# Patient Record
Sex: FEMALE | Race: WHITE
[De-identification: names, ages, dates, MRNs, and addresses within clinical notes are randomized per-mention and may not be internally consistent; named-entity substitution may affect disease eponyms.]

---

## 2019-04-04 ENCOUNTER — HOSPITAL ENCOUNTER (INPATIENT)
Dept: HOSPITAL 10 - E/R | Age: 34
LOS: 4 days | Discharge: HOME | DRG: 743 | End: 2019-04-08
Attending: OBSTETRICS & GYNECOLOGY | Admitting: OBSTETRICS & GYNECOLOGY
Payer: COMMERCIAL

## 2019-04-04 ENCOUNTER — HOSPITAL ENCOUNTER (INPATIENT)
Dept: HOSPITAL 91 - E/R | Age: 34
LOS: 4 days | Discharge: HOME | DRG: 743 | End: 2019-04-08
Payer: COMMERCIAL

## 2019-04-04 VITALS
HEIGHT: 68 IN | WEIGHT: 159.17 LBS | WEIGHT: 159.17 LBS | BODY MASS INDEX: 24.12 KG/M2 | HEIGHT: 68 IN | BODY MASS INDEX: 24.12 KG/M2

## 2019-04-04 DIAGNOSIS — D25.9: ICD-10-CM

## 2019-04-04 DIAGNOSIS — N80.1: ICD-10-CM

## 2019-04-04 DIAGNOSIS — N83.202: Primary | ICD-10-CM

## 2019-04-04 LAB
ADD MAN DIFF?: NO
ADD UMIC: YES
ALANINE AMINOTRANSFERASE: 11 IU/L (ref 13–69)
ALBUMIN/GLOBULIN RATIO: 1.25
ALBUMIN: 4.5 G/DL (ref 3.3–4.9)
ALKALINE PHOSPHATASE: 59 IU/L (ref 42–121)
ANION GAP: 11 (ref 5–13)
ASPARTATE AMINO TRANSFERASE: 17 IU/L (ref 15–46)
BASOPHIL #: 0.1 10^3/UL (ref 0–0.1)
BASOPHILS %: 0.3 % (ref 0–2)
BILIRUBIN,DIRECT: 0 MG/DL (ref 0–0.2)
BILIRUBIN,TOTAL: 0.2 MG/DL (ref 0.2–1.3)
BLOOD UREA NITROGEN: 14 MG/DL (ref 7–20)
CALCIUM: 9.8 MG/DL (ref 8.4–10.2)
CARBON DIOXIDE: 26 MMOL/L (ref 21–31)
CHLORIDE: 102 MMOL/L (ref 97–110)
CREATININE: 0.74 MG/DL (ref 0.44–1)
EOSINOPHILS #: 0.1 10^3/UL (ref 0–0.5)
EOSINOPHILS %: 0.4 % (ref 0–7)
GLOBULIN: 3.6 G/DL (ref 1.3–3.2)
GLUCOSE: 99 MG/DL (ref 70–220)
HEMATOCRIT: 36 % (ref 37–47)
HEMOGLOBIN: 12.4 G/DL (ref 12–16)
IMMATURE GRANS #M: 0.09 10^3/UL (ref 0–0.03)
IMMATURE GRANS % (M): 0.5 % (ref 0–0.43)
LIPASE: 124 U/L (ref 23–300)
LYMPHOCYTES #: 2.7 10^3/UL (ref 0.8–2.9)
LYMPHOCYTES %: 13.7 % (ref 15–51)
MEAN CORPUSCULAR HEMOGLOBIN: 27.2 PG (ref 29–33)
MEAN CORPUSCULAR HGB CONC: 34.4 G/DL (ref 32–37)
MEAN CORPUSCULAR VOLUME: 78.9 FL (ref 82–101)
MEAN PLATELET VOLUME: 11.7 FL (ref 7.4–10.4)
MONOCYTE #: 1.1 10^3/UL (ref 0.3–0.9)
MONOCYTES %: 5.5 % (ref 0–11)
NEUTROPHIL #: 16 10^3/UL (ref 1.6–7.5)
NEUTROPHILS %: 79.6 % (ref 39–77)
NUCLEATED RED BLOOD CELLS #: 0 10^3/UL (ref 0–0)
NUCLEATED RED BLOOD CELLS%: 0 /100WBC (ref 0–0)
PLATELET COUNT: 273 10^3/UL (ref 140–415)
POTASSIUM: 4.2 MMOL/L (ref 3.5–5.1)
RED BLOOD COUNT: 4.56 10^6/UL (ref 4.2–5.4)
RED CELL DISTRIBUTION WIDTH: 13.8 % (ref 11.5–14.5)
SODIUM: 139 MMOL/L (ref 135–144)
TOTAL PROTEIN: 8.1 G/DL (ref 6.1–8.1)
UR ASCORBIC ACID: NEGATIVE MG/DL
UR BILIRUBIN (DIP): NEGATIVE MG/DL
UR BLOOD (DIP): (no result) MG/DL
UR CLARITY: CLEAR
UR COLOR: (no result)
UR GLUCOSE (DIP): NEGATIVE MG/DL
UR KETONES (DIP): NEGATIVE MG/DL
UR LEUKOCYTE ESTERASE (DIP): NEGATIVE LEU/UL
UR NITRITE (DIP): NEGATIVE MG/DL
UR PH (DIP): 5 (ref 5–9)
UR RBC: 0 /HPF (ref 0–5)
UR SPECIFIC GRAVITY (DIP): 1.01 (ref 1–1.03)
UR TOTAL PROTEIN (DIP): NEGATIVE MG/DL
UR UROBILINOGEN (DIP): NEGATIVE MG/DL
UR WBC: 6 /HPF (ref 0–5)
WHITE BLOOD COUNT: 20 10^3/UL (ref 4.8–10.8)

## 2019-04-04 PROCEDURE — 74177 CT ABD & PELVIS W/CONTRAST: CPT

## 2019-04-04 PROCEDURE — 88305 TISSUE EXAM BY PATHOLOGIST: CPT

## 2019-04-04 PROCEDURE — 88104 CYTOPATH FL NONGYN SMEARS: CPT

## 2019-04-04 PROCEDURE — 83690 ASSAY OF LIPASE: CPT

## 2019-04-04 PROCEDURE — 80053 COMPREHEN METABOLIC PANEL: CPT

## 2019-04-04 PROCEDURE — 86140 C-REACTIVE PROTEIN: CPT

## 2019-04-04 PROCEDURE — 81001 URINALYSIS AUTO W/SCOPE: CPT

## 2019-04-04 PROCEDURE — 36415 COLL VENOUS BLD VENIPUNCTURE: CPT

## 2019-04-04 PROCEDURE — 85025 COMPLETE CBC W/AUTO DIFF WBC: CPT

## 2019-04-04 PROCEDURE — 86304 IMMUNOASSAY TUMOR CA 125: CPT

## 2019-04-04 PROCEDURE — 76830 TRANSVAGINAL US NON-OB: CPT

## 2019-04-04 PROCEDURE — 96374 THER/PROPH/DIAG INJ IV PUSH: CPT

## 2019-04-04 PROCEDURE — 81025 URINE PREGNANCY TEST: CPT

## 2019-04-04 PROCEDURE — 87086 URINE CULTURE/COLONY COUNT: CPT

## 2019-04-04 PROCEDURE — 76856 US EXAM PELVIC COMPLETE: CPT

## 2019-04-04 PROCEDURE — 74176 CT ABD & PELVIS W/O CONTRAST: CPT

## 2019-04-04 PROCEDURE — 99285 EMERGENCY DEPT VISIT HI MDM: CPT

## 2019-04-04 RX ADMIN — VASOPRESSIN 1: 20 INJECTION, SOLUTION INTRAMUSCULAR; SUBCUTANEOUS at 22:57

## 2019-04-04 RX ADMIN — PIPERACILLIN SODIUM AND TAZOBACTAM SODIUM 1 MLS/HR: 3; .375 INJECTION, POWDER, LYOPHILIZED, FOR SOLUTION INTRAVENOUS at 23:19

## 2019-04-04 RX ADMIN — THIAMINE HYDROCHLORIDE 1 MLS/HR: 100 INJECTION, SOLUTION INTRAMUSCULAR; INTRAVENOUS at 23:19

## 2019-04-04 RX ADMIN — ACETAMINOPHEN 1 MG: 500 TABLET, FILM COATED ORAL at 19:20

## 2019-04-04 RX ADMIN — IOHEXOL 1 ML: 300 INJECTION, SOLUTION INTRAVENOUS at 22:57

## 2019-04-04 RX ADMIN — SODIUM CHLORIDE 1 ML: 9 INJECTION, SOLUTION INTRAMUSCULAR; INTRAVENOUS; SUBCUTANEOUS at 22:57

## 2019-04-04 NOTE — ERD
ER Documentation


Chief Complaint


Chief Complaint





pelvic pain, fever X 3 hrs





HPI


This is a 33-year-old female with a history of fibroids who presents ED with 


right lower abdominal pain that is been present since 1 AM this morning.  Yovani villegas describes the pain is pressure-like and sharp and states that it is 


constant.  Worsened by movement.  Last menstrual period 19.  Patient is 


currently on menses.  Admits to nauseA and fevers.  Denies vomiting, 


hematemesis, hemoptysis, diarrhea, cuts patient, melena, hematochezia, chest 


pain, shortness breath, dysuria, hematuria, vaginal pain, vaginal discharge and 


all other symptoms.  Patient is sexually active and uses condoms for birth 


control.  Denies history of STD or pelvic inflammatory disease.





ROS


All systems reviewed and are negative except as per history of present illness.





Allergies


Allergies:  


Coded Allergies:  


     No Known Allergy (Unverified , 19)





PMhx/Soc


Medical and Surgical Hx:  pt denies Medical Hx, pt denies Surgical Hx


Hx Alcohol Use:  No


Hx Substance Use:  No


Hx Tobacco Use:  No


Smoking Status:  Never smoker





FmHx


Family History:  No diabetes





Physical Exam


Vitals





Vital Signs


  Date      Temp  Pulse  Resp  B/P (MAP)   Pulse Ox  O2          O2 Flow    FiO2


Time                                                 Delivery    Rate


    19  98.7     84    18      141/73       100


     15:07                           (95)





Physical Exam


Physical Exam


Vitals signs: Reviewed by me.


General: Well developed, well nourished, in no acute distress. Patient is awake 


and alert.


Head: Normocephalic, atraumatic.


Eyes: Normal conjunctiva, Pupils PERRLA, EOM intact grossly


ENT: Pharynx is clear, Moist mucous membranes, external ears, nose and mouth 


normal


Neck: Supple, no masses, lymphadenopathy or JVD


Respiratory: Clear to auscultation bilaterally with no wheezing, rhonchi, rales,


no distress


Cardiovascular: RRR, no murmurs, rubs, or gallops


Abdominal: Soft, nondistended, no peritoneal signs, no rigidity, no surgical 


abdomen, bowel sounds present all 4 quadrants, mild tenderness palpation in 


right lower quadrant right lower pelvis, nontender palpation all other areas, no


rebound tenderness


Back: No midline tenderness. No flank tenderness


Neurologic: Alert and oriented, moving all extremities, normal speech, no focal 


weakness, no cerebellar signs. Normal mentation


Skin: warm and dry, No rash


Psych: Normal mood


Result Diagram:  


19





Results 24 hrs





Laboratory Tests


       Test
                                   19
19:28  19
19:40


       White Blood Count                      20.0 10^3/ul


       Red Blood Count                        4.56 10^6/ul


       Hemoglobin                                12.4 g/dl


       Hematocrit                                   36.0 %


       Mean Corpuscular Volume                     78.9 fl


       Mean Corpuscular Hemoglobin                 27.2 pg


       Mean Corpuscular Hemoglobin
Concent      34.4 g/dl 
  



       Red Cell Distribution Width                  13.8 %


       Platelet Count                          273 10^3/UL


       Mean Platelet Volume                        11.7 fl


       Immature Granulocytes %                     0.500 %


       Neutrophils %                                79.6 %


       Lymphocytes %                                13.7 %


       Monocytes %                                   5.5 %


       Eosinophils %                                 0.4 %


       Basophils %                                   0.3 %


       Nucleated Red Blood Cells %             0.0 /100WBC


       Immature Granulocytes #               0.090 10^3/ul


       Neutrophils #                          16.0 10^3/ul


       Lymphocytes #                           2.7 10^3/ul


       Monocytes #                             1.1 10^3/ul


       Eosinophils #                           0.1 10^3/ul


       Basophils #                             0.1 10^3/ul


       Nucleated Red Blood Cells #             0.0 10^3/ul


       Urine Color                          STRAW


       Urine Clarity                        CLEAR


       Urine pH                                        5.0


       Urine Specific Gravity                        1.011


       Urine Ketones                        NEGATIVE mg/dL


       Urine Nitrite                        NEGATIVE mg/dL


       Urine Bilirubin                      NEGATIVE mg/dL


       Urine Urobilinogen                   NEGATIVE mg/dL


       Urine Leukocyte Esterase
            NEGATIVE
Tiffani/ul  



       Urine Microscopic RBC                        0 /HPF


       Urine Microscopic WBC                        6 /HPF


       Urine Hemoglobin                           3+ mg/dL


       Urine Glucose                        NEGATIVE mg/dL


       Urine Total Protein                  NEGATIVE mg/dl


       Sodium Level                             139 mmol/L


       Potassium Level                          4.2 mmol/L


       Chloride Level                           102 mmol/L


       Carbon Dioxide Level                      26 mmol/L


       Anion Gap                                        11


       Blood Urea Nitrogen                        14 mg/dl


       Creatinine                               0.74 mg/dl


       Est Glomerular Filtrat Rate
mL/min   > 60 mL/min 
    



       Glucose Level                              99 mg/dl


       Calcium Level                             9.8 mg/dl


       Total Bilirubin                           0.2 mg/dl


       Direct Bilirubin                         0.00 mg/dl


       Indirect Bilirubin                        0.2 mg/dl


       Aspartate Amino Transf
(AST/SGOT)          17 IU/L 
  



       Alanine Aminotransferase
(ALT/SGPT)        11 IU/L 
  



       Alkaline Phosphatase                        59 IU/L


       Total Protein                              8.1 g/dl


       Albumin                                    4.5 g/dl


       Globulin                                  3.60 g/dl


       Albumin/Globulin Ratio                         1.25


       Lipase                                      124 U/L


       POC Beta HCG, Qualitative                             NEGATIVE





Current Medications


 Medications
   Dose
          Sig/Keara
       Start Time
   Status  Last


 (Trade)       Ordered        Route
 PRN     Stop Time              Admin
Dose


                              Reason                                Admin


                1,000 mg       ONCE  STAT
    19        DC            19


Acetaminophen                 PO
            19:01
 19                19:20




  (Tylenol                                  19:04


Tab)


 IV Flush
      10 ml          STK-MED        19        DC       



(NS 10 ml)                    ONCE
 .ROUTE
  21:39
 19


                                             21:40


 Sodium         100 ml @ ud    STK-MED        19        DC       



Chloride                      ONCE
 .ROUTE
  21:39
 19


                                             21:40


 Iohexol
       150 ml         STK-MED        19        DC       



(Omnipaque                    ONCE
 .ROUTE
  21:39
 19


300mg/
 ml)                                  21:40


 Piperacillin   100 ml @ 
     ONCE  ONCE
    19                 



Sod/
          200 mls/hr     IVPB
          23:30
 19


Tazobactam                                   23:59


Sod


 Sodium         1,000 ml @ 
   Q1H STAT
      19                 



Chloride       1,000 mls/hr   IV
            23:07
 19


                                             00:06








Procedures/MDM


EKG, MONITORS, & DIAGNOSTIC IMAGING:


                          [place] [address]


                        Radiology Main Line: [phone]





                            DIAGNOSTIC IMAGING REPORT





Patient: BERNIE LYONS   : 1985   Age: 33  Sex: F                 


      


       MR #:    [id number]   Acct #:   [id number]    DOS: 19 1901


Ordering MD: NAY PATRICK PA-C   Location:  [facility]   Room/Bed:               


                            


                                        


PROCEDURE:   CT abdomen and pelvis without contrast. 


 


CLINICAL INDICATION:   Abdominal Pain   


 


TECHNIQUE:   CT scan of the abdomen and pelvis without contrast was performed 


and is reconstructed at 2.5 mm contiguous axial intervals from the dome of the 


diaphragm to the inferior pubic rami..  The patient was scanned without 


intravenous contrast.  Sagittal and coronal reformatted images were obtained 


from the axial source images. The calculated radiation dose measures 355 mGy 


centimeters. The CTDI measures 6.1 mGy.


 


Individualized dose optimization technique was used for the performance of this 


exam.


This included


1. Automated exposure control.


2. Adjustment of the mA and / or kV according to the patient's size.


3. Use of iterative reconstructed technique.


 


 


COMPARISON:   None. 


 


FINDINGS:


 


 


The lung bases are clear of any infiltrate or nodule.  No effusion is seen.


The liver is of normal size, contour and attenuation with no mass or ductal 


dilatation.  No gallstones are visualized.  No splenic, adrenal or pancreatic 


abnormalities present. 


 Kidneys are of normal size and contour.  No hydronephrosis, calculus or masses 


seen.  Ureters are of normal course and caliber with no stone.  No bladder mass 


or stone is present. There are matted loops of bowel in the pelvis with 


infiltration of the mesenteric fat. Noted is a complex septated fluid collection


in the right cul-de-sac measuring approximately 7 by 6 cm. This corresponds to 


the abnormality found on the recent ultrasound and likely represents a tubal 


ovarian abscess. Uterus is enlarged with lobulated contour compatible with 


fibroids.


  There is no aneurysm.  No adenopathy is present.


  No bowel mass or obstruction is present.  The appendix is not confidently 


seen..  There is a small volume of pelvic ascites. No pneumoperitoneum is 


visualized.


The osseous structures are intact.


 


IMPRESSION:


Complex predominately cystic septated mass right pelvis highly suspicious for 


tumor ovarian abscess on limited CT without oral or intravenous contrast. 


Clinical correlation suggested. Consider repeat targeted pelvic CT with oral and


intravenous contrast for more definitive diagnosis if clinically indicated.


_____________________________________________ 


.Lewis Lemus MD, MD           Date    Time 


Electronically viewed and signed by .Lewis Lemus MD, MD on 2019 


20:34 


 


D:  2019 20:34  T:  2019 20:34


.A/





CC: NAY PATRICK PA-C





[id number]





                          [place] [address]


                        Radiology Main Line: [phone]





                            DIAGNOSTIC IMAGING REPORT





Patient: BERNIE LYONS   : 1985   Age: 33  Sex: F                 


      


       MR #:    [id number]   Acct #:   [id number]    DOS: 19 1901


Ordering MD: NAY PATRICK PA-C   Location:  [facility]   Room/Bed:               


                            


                                        


PROCEDURE:   US Pelvis. 


 


CLINICAL INDICATION:   pelvic pain 


 


TECHNIQUE:   Multiple sonographic images of the pelvis were obtained utilizing 


transabdominal  technique.   The images were reviewed on a PACS workstation. 


 


COMPARISON:   None. 


 


FINDINGS:


 


The uterus is normal in size with a heterogeneous appearance of the myometrium. 


The uterus measures 10.6 x 4.2 x 6.1 cm. There are multiple heterogeneous masses


in the uterus, the largest is exophytic and measures 6.0 x 4.9 x 4.6 cm.


The endometrial stripe is homogeneous in appearance and has the thickness of 7 


mm.


 


There is a complex heterogeneous tubular structure in the right posterior aspect


of the uterus, measuring 9.2 x 4.9 x 4.6 cm. There is internal debris within 


this structure, suspicious for a possible tubo-ovarian abscess.


 


The right ovary is normal in size and measures 4.3 x 2.4 x 2.9 cm. There is 


normal Doppler flow in the right ovary.


The left ovary was not visualized..


 


There is a small amount of free fluid in the pelvis.


 


RPTAT: AA


 


IMPRESSION:


 


Large tubular structure in the right posterior aspect of the uterus, measuring 


up to 9.2 cm. There is internal debris, suspicious for a tubo-ovarian abscess. 


Follow-up CT with contrast is recommended.


Heterogeneous uterus with multiple fibroids.


_____________________________________________ 


.Kostas Martinez MD, MD           Date    Time 


Electronically viewed and signed by .Kostas Martinez MD, MD on 2019 


20:19 


 


D:  2019 20:19  T:  2019 20:19


.S/





CC: NAY PATRICK PA-C





[id number]








LAB INTERPRETATION:


CBC leukocytosis with a WBC of 20.0, mildly decreased hematocrit 36.0, elevated 


neutrophil percentage 79.6%


Chemistry shows no evidence of significant electrolyte abnormalities or renal 


insufficiency


Liver function test shows no evidence of acute biliary or hepatic dysfunction


Lipase shows no evidence of acute pancreatitis


Urine pregnancy negative


Urinalysis shows 6 microscopic WBCs, no leukocyte esterase and no RBCs





ER COURSE:


The patient was given tylenol AND ZOSYN


The medication was well tolerated and the patient reports improvement in 


symptoms.  


The patient was stable throughout ED course.


I kept the patient and/or family informed of laboratory and diagnostic imaging 


results throughout the emergency room course.





The patient was promptly evaluated and a treatment plan was devised based on H&P


and other data. This plan was discussed with the patient who agreed and had no 


further questions or concerns prior to discharge.





MEDICAL DECISION MAKING:


This is a 33-year-old female with a history of fibroids who presents ED with 


right lower abdominal pain that is been present since 1 AM this morning.  


Patient describes the pain is pressure-like and sharp and states that it is co


nstant.  Worsened by movement.  Physical examination is remarkable for some 


tenderness in the lower abdomen.  Proceeded with ordering blood work and CT as 


well as ultrasound.  Lab work is remarkable for a leukocytosis of 20.  Imaging 


shows a possible tubo-ovarian abscess.  I discussed patient with on-call labor 


is Dr. Chappell who advises that patient should be admitted to hospital for further 


workup.  Patient was accepted by Dr. Chappell [id number].  Patient was started on Zosyn in 


the emergency department.





Disclaimer: Inadvertent spelling and grammatical errors are likely due to 


EHR/dictation software use and do not reflect on the overall quality of patient 


care. Also, please note that the electronic time recorded on this note does not 


necessarily reflect the actual time of the patient encounter.





Departure


Diagnosis:  


   Primary Impression:  


   Pelvic mass


   Additional Impression:  


   Leukocytosis


   Leukocytosis type:  unspecified  Qualified Codes:  D72.829 - Elevated white 


   blood cell count, unspecified


Condition:  Stable











NAY PATRICK PA-C           2019 19:21

## 2019-04-05 VITALS — RESPIRATION RATE: 18 BRPM | HEART RATE: 85 BPM | DIASTOLIC BLOOD PRESSURE: 70 MMHG | SYSTOLIC BLOOD PRESSURE: 118 MMHG

## 2019-04-05 VITALS — DIASTOLIC BLOOD PRESSURE: 62 MMHG | RESPIRATION RATE: 18 BRPM | SYSTOLIC BLOOD PRESSURE: 101 MMHG | HEART RATE: 102 BPM

## 2019-04-05 VITALS — HEART RATE: 94 BPM | DIASTOLIC BLOOD PRESSURE: 73 MMHG | SYSTOLIC BLOOD PRESSURE: 120 MMHG | RESPIRATION RATE: 18 BRPM

## 2019-04-05 VITALS — DIASTOLIC BLOOD PRESSURE: 54 MMHG | RESPIRATION RATE: 20 BRPM | HEART RATE: 95 BPM | SYSTOLIC BLOOD PRESSURE: 95 MMHG

## 2019-04-05 LAB
ADD MAN DIFF?: NO
ADD UMIC: YES
ALANINE AMINOTRANSFERASE: 8 IU/L (ref 13–69)
ALBUMIN/GLOBULIN RATIO: 1.15
ALBUMIN: 3.7 G/DL (ref 3.3–4.9)
ALKALINE PHOSPHATASE: 47 IU/L (ref 42–121)
ANION GAP: 8 (ref 5–13)
ASPARTATE AMINO TRANSFERASE: 13 IU/L (ref 15–46)
BASOPHIL #: 0 10^3/UL (ref 0–0.1)
BASOPHILS %: 0.3 % (ref 0–2)
BILIRUBIN,DIRECT: 0 MG/DL (ref 0–0.2)
BILIRUBIN,TOTAL: 1 MG/DL (ref 0.2–1.3)
BLOOD UREA NITROGEN: 10 MG/DL (ref 7–20)
C-REACTIVE PROTEIN: 4.7 MG/DL (ref 0–0.9)
CALCIUM: 9.1 MG/DL (ref 8.4–10.2)
CANCER ANTIGEN 125: 740 U/ML (ref 0–35)
CARBON DIOXIDE: 22 MMOL/L (ref 21–31)
CHLORIDE: 110 MMOL/L (ref 97–110)
CREATININE: 0.7 MG/DL (ref 0.44–1)
EOSINOPHILS #: 0.3 10^3/UL (ref 0–0.5)
EOSINOPHILS %: 2.4 % (ref 0–7)
GLOBULIN: 3.2 G/DL (ref 1.3–3.2)
GLUCOSE: 120 MG/DL (ref 70–220)
HEMATOCRIT: 31.2 % (ref 37–47)
HEMOGLOBIN: 10.6 G/DL (ref 12–16)
IMMATURE GRANS #M: 0.05 10^3/UL (ref 0–0.03)
IMMATURE GRANS % (M): 0.4 % (ref 0–0.43)
LYMPHOCYTES #: 1.6 10^3/UL (ref 0.8–2.9)
LYMPHOCYTES %: 12.8 % (ref 15–51)
MEAN CORPUSCULAR HEMOGLOBIN: 26.8 PG (ref 29–33)
MEAN CORPUSCULAR HGB CONC: 34 G/DL (ref 32–37)
MEAN CORPUSCULAR VOLUME: 78.8 FL (ref 82–101)
MEAN PLATELET VOLUME: 11.7 FL (ref 7.4–10.4)
MONOCYTE #: 1 10^3/UL (ref 0.3–0.9)
MONOCYTES %: 7.7 % (ref 0–11)
NEUTROPHIL #: 9.7 10^3/UL (ref 1.6–7.5)
NEUTROPHILS %: 76.4 % (ref 39–77)
NUCLEATED RED BLOOD CELLS #: 0 10^3/UL (ref 0–0)
NUCLEATED RED BLOOD CELLS%: 0 /100WBC (ref 0–0)
PLATELET COUNT: 222 10^3/UL (ref 140–415)
POTASSIUM: 3.8 MMOL/L (ref 3.5–5.1)
RED BLOOD COUNT: 3.96 10^6/UL (ref 4.2–5.4)
RED CELL DISTRIBUTION WIDTH: 13.7 % (ref 11.5–14.5)
SODIUM: 140 MMOL/L (ref 135–144)
TOTAL PROTEIN: 6.9 G/DL (ref 6.1–8.1)
UR ASCORBIC ACID: NEGATIVE MG/DL
UR BILIRUBIN (DIP): NEGATIVE MG/DL
UR BLOOD (DIP): (no result) MG/DL
UR CLARITY: CLEAR
UR COLOR: YELLOW
UR GLUCOSE (DIP): NEGATIVE MG/DL
UR KETONES (DIP): NEGATIVE MG/DL
UR LEUKOCYTE ESTERASE (DIP): NEGATIVE LEU/UL
UR NITRITE (DIP): NEGATIVE MG/DL
UR PH (DIP): 5 (ref 5–9)
UR RBC: 9 /HPF (ref 0–5)
UR SPECIFIC GRAVITY (DIP): 1.01 (ref 1–1.03)
UR TOTAL PROTEIN (DIP): NEGATIVE MG/DL
UR UROBILINOGEN (DIP): NEGATIVE MG/DL
UR WBC: 8 /HPF (ref 0–5)
WHITE BLOOD COUNT: 12.7 10^3/UL (ref 4.8–10.8)

## 2019-04-05 RX ADMIN — PIPERACILLIN SODIUM AND TAZOBACTAM SODIUM 1 MLS/HR: 3; .375 INJECTION, POWDER, LYOPHILIZED, FOR SOLUTION INTRAVENOUS at 15:30

## 2019-04-05 RX ADMIN — PIPERACILLIN SODIUM AND TAZOBACTAM SODIUM 1 MLS/HR: 3; .375 INJECTION, POWDER, LYOPHILIZED, FOR SOLUTION INTRAVENOUS at 17:00

## 2019-04-05 RX ADMIN — IBUPROFEN PRN MG: 600 TABLET ORAL at 21:33

## 2019-04-05 RX ADMIN — FAMOTIDINE 1 MG: 20 TABLET ORAL at 20:54

## 2019-04-05 RX ADMIN — IBUPROFEN 1 MG: 600 TABLET ORAL at 06:59

## 2019-04-05 RX ADMIN — FAMOTIDINE SCH MG: 20 TABLET ORAL at 20:54

## 2019-04-05 RX ADMIN — IBUPROFEN PRN MG: 600 TABLET ORAL at 15:31

## 2019-04-05 RX ADMIN — IBUPROFEN 1 MG: 600 TABLET ORAL at 15:31

## 2019-04-05 RX ADMIN — PIPERACILLIN SODIUM AND TAZOBACTAM SODIUM SCH MLS/HR: 3; .375 INJECTION, POWDER, LYOPHILIZED, FOR SOLUTION INTRAVENOUS at 17:00

## 2019-04-05 RX ADMIN — IBUPROFEN 1 MG: 600 TABLET ORAL at 21:33

## 2019-04-05 RX ADMIN — PIPERACILLIN SODIUM AND TAZOBACTAM SODIUM SCH MLS/HR: 3; .375 INJECTION, POWDER, LYOPHILIZED, FOR SOLUTION INTRAVENOUS at 15:30

## 2019-04-05 RX ADMIN — IBUPROFEN PRN MG: 600 TABLET ORAL at 06:59

## 2019-04-05 NOTE — HP
Date/Time of Note


Date/Time of Note


DATE: 4/5/19 


TIME: 10:23





Assessment/Plan


VTE Prophylaxis


SCD applied (from Nsg):  Yes


Pharmacological prophylaxis:  NA/contraindicated


Pharm contraindication:  low risk/ambulating





Lines/Catheters


IV Catheter Type (from Nrsg):  Saline Lock


Central line still needed:  No


Urinary Cath still in place:  No





Assessment/Plan


Assessment/Plan


A    pelvic pain 


      uterine fibroids  


      TOA 


      leukocytosis


P    observe with broad spectrum antibiotics


      initially, f/u  depend upon clinical course


Result Diagram:  


4/4/19 1928                                                                     


          4/5/19 0551





Results 24hrs





Laboratory Tests


 Test
                                4/4/19
19:28  4/4/19
19:40  4/5/19
05:51


 White Blood Count                         20.0  H


 Red Blood Count                            4.56


 Hemoglobin                                 12.4


 Hematocrit                                36.0  L


 Mean Corpuscular Volume                   78.9  L


 Mean Corpuscular Hemoglobin               27.2  L


 Mean Corpuscular Hemoglobin
Concent       34.4  
  
             



 Red Cell Distribution Width                13.8


 Platelet Count                              273


 Mean Platelet Volume                      11.7  H


 Immature Granulocytes %                  0.500  H


 Neutrophils %                             79.6  H


 Lymphocytes %                             13.7  L


 Monocytes %                                 5.5


 Eosinophils %                               0.4


 Basophils %                                 0.3


 Nucleated Red Blood Cells %                 0.0


 Immature Granulocytes #                  0.090  H


 Neutrophils #                             16.0  H


 Lymphocytes #                               2.7


 Monocytes #                                1.1  H


 Eosinophils #                               0.1


 Basophils #                                 0.1


 Nucleated Red Blood Cells #                 0.0


 Urine Color                          STRAW


 Urine Clarity                        CLEAR


 Urine pH                                    5.0


 Urine Specific Gravity                    1.011


 Urine Ketones                        NEGATIVE


 Urine Nitrite                        NEGATIVE


 Urine Bilirubin                      NEGATIVE


 Urine Urobilinogen                   NEGATIVE


 Urine Leukocyte Esterase             NEGATIVE


 Urine Microscopic RBC                         0


 Urine Microscopic WBC                        6  H


 Urine Hemoglobin                            3+  H


 Urine Glucose                        NEGATIVE


 Urine Total Protein                  NEGATIVE


 Sodium Level                                139                         140


 Potassium Level                             4.2                         3.8


 Chloride Level                              102                         110


 Carbon Dioxide Level                         26                          22


 Anion Gap                                    11                           8


 Blood Urea Nitrogen                          14                          10


 Creatinine                                 0.74                        0.70


 Est Glomerular Filtrat Rate
mL/min   > 60  
       
             > 60  



 Glucose Level                                99                         120


 Calcium Level                               9.8                         9.1


 Total Bilirubin                             0.2                         1.0


 Direct Bilirubin                           0.00                        0.00


 Indirect Bilirubin                          0.2                         1.0


 Aspartate Amino Transf
(AST/SGOT)           17  
  
                   13  L



 Alanine Aminotransferase
(ALT/SGPT)        11  L
  
                    8  L



 Alkaline Phosphatase                         59                          47


 Total Protein                               8.1                        6.9  #


 Albumin                                     4.5                         3.7


 Globulin                                  3.60  H                      3.20


 Albumin/Globulin Ratio                     1.25                        1.15


 Lipase                                      124


 POC Beta HCG, Qualitative                          NEGATIVE


 C-Reactive Protein                                                     4.7  H








HPI/ROS


Admit Date/Time


Admit Date/Time


Apr 5, 2019 at 04:11





Hx of Present Illness


This is 33y.o nulligravida who is currently on period,presents ED with severe 


lower abdominal pain mostly on right side with nausea since 04/04/19/0100


She stated that  pain was  so severe bend over and unable to get up fo  


approximately 45min  on the floor,took the Ibuprofen  400mg and 600mg on two 


occasions .


Pain got aggrevated with movement and alleviated with rest.,also  she stated 


that she had  fever  but didnt take temperature.


She  is also known to have  uterine fibroids which was discovered  recently  in 


Feb;2019.


She is  sexually active  for the last 3yrs with x1 partner  using condom 


always,HPV vaccination at 19y.o


menarche   at 14y.o  regular period monthly ,last 7days  which is heav with 


passing clots,.


For the last 2yrs  she has been having  pelvic pain with only period time but  


for the last 2mo  ,pain is constant , even without menses.


patient denies no  other subject symptoms except on and off constipation, lately


noticed that her pelvic pain  radiates to the back.


U/s and  CT pelvis with contrast ,revealed multple uterine  fbroids ,largest 


exophytic6.0cmand tubulat complex septated  cystic devorah wih internal cho in it  


behind rtadnea behind the uterus  suggesting tubo ovrian  abscess.


Patient had no hx of pelvic infection or STD


Base on WBC  which was 20.000,with u/s ,admitted  for antibiotic treatment and  


observe the clinical course .





ROS


nausea 


RLQ pain 


back pain


Constitutional:  no complaints, improved


Eyes:  no complaints


ENT:  no complaints


Respiratory:  no complaints


Cardiovascular:  no complaints


Gastrointestinal:  pain, nausea


Genitourinary:  no complaints


Musculoskeletal:  no complaints, back pain


Skin:  no complaints


Neurologic:  no complaints


Endocrine:  no complaints


Lymphatic:  no complaints


Psychological:  no complaints





PMH/Family/Social


Past Medical History


Medical History:  no pertinent history


Medications





Current Medications


Ibuprofen (Motrin) 600 mg Q6H  PRN PO PAIN LEVEL 6-10 Last administered on 


4/5/19at 06:59; Admin Dose 600 MG;  Start 4/5/19 at 07:00


Coded Allergies:  


     No Known Allergy (Unverified , 4/4/19)





Past Surgical History


Past Surgical Hx:  no surgical history





Family History


Significant Family History:  heart disease, cancer, diabetes, hypertension, lung


disease (gm(P) HTN DM Stomach cancer m HTN F brain tumor A(m) htn DM , multiple 


heart attack ,GF(P) lung cancer etc), other (lung cancer , brain cancer,  )





Social History


Alcohol Use:  none


Smoking Status:  Never smoker


Drug Use:  none





Exam/Review of Systems


Vital Signs


Vitals





Vital Signs


  Date      Temp  Pulse  Resp  B/P (MAP)   Pulse Ox  O2          O2 Flow    FiO2


Time                                                 Delivery    Rate


    4/5/19  98.6     94    18      120/73        99  Room Air


     07:18                           (89)








Intake and Output





4/4/19 4/4/19 4/5/19





1515:00


23:00


07:00





IntakeIntake Total


150 ml





BalanceBalance


150 ml














Exam


Constitutional:  alert, oriented, well developed


Psych:  no complaints


Head:  normocephalic, atraumatic


Eyes:  nl conjunctiva, EOMI, nl lids, nl sclera, PERRL


ENMT:  nl external ears & nose, nl lips & teeth, nl nasal mucosa & septum


Neck:  supple, non-tender


Respiratory:  clear to auscultation, normal air movement


Cardiovascular:  regular rate and rhythm, nl pulses


Gastrointestinal:  rebound or guarding (no rebound tenderness but guarding  


noted), tender ( on RLQ)


Genitourinary - Female:  uterus (enlarged with multiple fibroids/US)


Musculoskeletal:  nl extremities to inspection


Extremities:  normal pulses











CHEMA SHEN MD                 Apr 5, 2019 11:05

## 2019-04-06 VITALS — SYSTOLIC BLOOD PRESSURE: 94 MMHG | HEART RATE: 82 BPM | RESPIRATION RATE: 18 BRPM | DIASTOLIC BLOOD PRESSURE: 50 MMHG

## 2019-04-06 VITALS — RESPIRATION RATE: 18 BRPM | SYSTOLIC BLOOD PRESSURE: 114 MMHG | DIASTOLIC BLOOD PRESSURE: 56 MMHG | HEART RATE: 78 BPM

## 2019-04-06 VITALS — SYSTOLIC BLOOD PRESSURE: 102 MMHG | DIASTOLIC BLOOD PRESSURE: 60 MMHG | HEART RATE: 74 BPM | RESPIRATION RATE: 20 BRPM

## 2019-04-06 VITALS — HEART RATE: 74 BPM | DIASTOLIC BLOOD PRESSURE: 66 MMHG | SYSTOLIC BLOOD PRESSURE: 138 MMHG | RESPIRATION RATE: 18 BRPM

## 2019-04-06 RX ADMIN — PIPERACILLIN SODIUM AND TAZOBACTAM SODIUM 1 MLS/HR: 3; .375 INJECTION, POWDER, LYOPHILIZED, FOR SOLUTION INTRAVENOUS at 00:24

## 2019-04-06 RX ADMIN — PIPERACILLIN SODIUM AND TAZOBACTAM SODIUM 1 MLS/HR: 3; .375 INJECTION, POWDER, LYOPHILIZED, FOR SOLUTION INTRAVENOUS at 18:35

## 2019-04-06 RX ADMIN — PIPERACILLIN SODIUM AND TAZOBACTAM SODIUM 1 MLS/HR: 3; .375 INJECTION, POWDER, LYOPHILIZED, FOR SOLUTION INTRAVENOUS at 12:36

## 2019-04-06 RX ADMIN — PIPERACILLIN SODIUM AND TAZOBACTAM SODIUM SCH MLS/HR: 3; .375 INJECTION, POWDER, LYOPHILIZED, FOR SOLUTION INTRAVENOUS at 06:17

## 2019-04-06 RX ADMIN — FAMOTIDINE 1 MG: 20 TABLET ORAL at 08:59

## 2019-04-06 RX ADMIN — PIPERACILLIN SODIUM AND TAZOBACTAM SODIUM SCH MLS/HR: 3; .375 INJECTION, POWDER, LYOPHILIZED, FOR SOLUTION INTRAVENOUS at 00:24

## 2019-04-06 RX ADMIN — PIPERACILLIN SODIUM AND TAZOBACTAM SODIUM 1 MLS/HR: 3; .375 INJECTION, POWDER, LYOPHILIZED, FOR SOLUTION INTRAVENOUS at 06:17

## 2019-04-06 RX ADMIN — PIPERACILLIN SODIUM AND TAZOBACTAM SODIUM SCH MLS/HR: 3; .375 INJECTION, POWDER, LYOPHILIZED, FOR SOLUTION INTRAVENOUS at 12:36

## 2019-04-06 RX ADMIN — FAMOTIDINE SCH MG: 20 TABLET ORAL at 21:01

## 2019-04-06 RX ADMIN — FAMOTIDINE SCH MG: 20 TABLET ORAL at 08:59

## 2019-04-06 RX ADMIN — FAMOTIDINE 1 MG: 20 TABLET ORAL at 21:01

## 2019-04-06 RX ADMIN — PIPERACILLIN SODIUM AND TAZOBACTAM SODIUM SCH MLS/HR: 3; .375 INJECTION, POWDER, LYOPHILIZED, FOR SOLUTION INTRAVENOUS at 18:35

## 2019-04-06 NOTE — QN
Documentation


Comment


HD #2 for possible TOA


Pt reports feeling much better and is pain free. She is on Zosyn and tolerating 


that well. She is up and ambulating. 


T=98.2  /56


Abdomen soft, nontender and no masses palpable.


WBC decreased from 20k to 12.7.. Hgb was 12.4 and now 10.6 (due to dilution).


A: Possible TOA with great symptomatic improvement. If a TOA it is of borderline


size to consider medical therapy with antibiotics only. It is 7 x 6 cm on MRI 


but 9.2 cm on US. 


P: To repeat imaging studies tomorrow to follow to see if there is improvement. 


If not, to consider either percutaneous drainage or surgery to remove, both of 


which offer information on the actual identification not the presumed ID of the 


structure. Cont. Zosyn for now.











RUBIA BIRMINGHAM MD              Apr 6, 2019 16:34

## 2019-04-07 VITALS — SYSTOLIC BLOOD PRESSURE: 112 MMHG | HEART RATE: 74 BPM | DIASTOLIC BLOOD PRESSURE: 64 MMHG | RESPIRATION RATE: 17 BRPM

## 2019-04-07 VITALS — DIASTOLIC BLOOD PRESSURE: 62 MMHG | HEART RATE: 82 BPM | SYSTOLIC BLOOD PRESSURE: 107 MMHG | RESPIRATION RATE: 15 BRPM

## 2019-04-07 VITALS — DIASTOLIC BLOOD PRESSURE: 66 MMHG | HEART RATE: 58 BPM | RESPIRATION RATE: 15 BRPM | SYSTOLIC BLOOD PRESSURE: 104 MMHG

## 2019-04-07 VITALS — HEART RATE: 88 BPM | DIASTOLIC BLOOD PRESSURE: 68 MMHG | SYSTOLIC BLOOD PRESSURE: 110 MMHG | RESPIRATION RATE: 18 BRPM

## 2019-04-07 VITALS — SYSTOLIC BLOOD PRESSURE: 102 MMHG | HEART RATE: 56 BPM | RESPIRATION RATE: 12 BRPM | DIASTOLIC BLOOD PRESSURE: 67 MMHG

## 2019-04-07 VITALS — SYSTOLIC BLOOD PRESSURE: 104 MMHG | RESPIRATION RATE: 18 BRPM | DIASTOLIC BLOOD PRESSURE: 64 MMHG | HEART RATE: 85 BPM

## 2019-04-07 VITALS — DIASTOLIC BLOOD PRESSURE: 67 MMHG | SYSTOLIC BLOOD PRESSURE: 113 MMHG | RESPIRATION RATE: 18 BRPM | HEART RATE: 86 BPM

## 2019-04-07 VITALS — DIASTOLIC BLOOD PRESSURE: 66 MMHG | RESPIRATION RATE: 14 BRPM | HEART RATE: 58 BPM | SYSTOLIC BLOOD PRESSURE: 99 MMHG

## 2019-04-07 VITALS — DIASTOLIC BLOOD PRESSURE: 63 MMHG | SYSTOLIC BLOOD PRESSURE: 102 MMHG | RESPIRATION RATE: 11 BRPM | HEART RATE: 58 BPM

## 2019-04-07 VITALS — DIASTOLIC BLOOD PRESSURE: 63 MMHG | RESPIRATION RATE: 10 BRPM | SYSTOLIC BLOOD PRESSURE: 100 MMHG | HEART RATE: 54 BPM

## 2019-04-07 VITALS — DIASTOLIC BLOOD PRESSURE: 70 MMHG | SYSTOLIC BLOOD PRESSURE: 117 MMHG | RESPIRATION RATE: 18 BRPM | HEART RATE: 85 BPM

## 2019-04-07 VITALS — SYSTOLIC BLOOD PRESSURE: 110 MMHG | HEART RATE: 85 BPM | DIASTOLIC BLOOD PRESSURE: 67 MMHG | RESPIRATION RATE: 18 BRPM

## 2019-04-07 VITALS — SYSTOLIC BLOOD PRESSURE: 100 MMHG | DIASTOLIC BLOOD PRESSURE: 63 MMHG | RESPIRATION RATE: 14 BRPM | HEART RATE: 54 BPM

## 2019-04-07 VITALS — SYSTOLIC BLOOD PRESSURE: 112 MMHG | HEART RATE: 84 BPM | RESPIRATION RATE: 18 BRPM | DIASTOLIC BLOOD PRESSURE: 72 MMHG

## 2019-04-07 VITALS — RESPIRATION RATE: 18 BRPM | SYSTOLIC BLOOD PRESSURE: 109 MMHG | DIASTOLIC BLOOD PRESSURE: 67 MMHG | HEART RATE: 81 BPM

## 2019-04-07 VITALS — DIASTOLIC BLOOD PRESSURE: 66 MMHG | HEART RATE: 62 BPM | SYSTOLIC BLOOD PRESSURE: 105 MMHG | RESPIRATION RATE: 16 BRPM

## 2019-04-07 VITALS — DIASTOLIC BLOOD PRESSURE: 55 MMHG | SYSTOLIC BLOOD PRESSURE: 105 MMHG | HEART RATE: 77 BPM | RESPIRATION RATE: 18 BRPM

## 2019-04-07 VITALS — DIASTOLIC BLOOD PRESSURE: 65 MMHG | SYSTOLIC BLOOD PRESSURE: 103 MMHG | RESPIRATION RATE: 11 BRPM | HEART RATE: 58 BPM

## 2019-04-07 VITALS — SYSTOLIC BLOOD PRESSURE: 109 MMHG | HEART RATE: 56 BPM | RESPIRATION RATE: 16 BRPM | DIASTOLIC BLOOD PRESSURE: 64 MMHG

## 2019-04-07 VITALS — RESPIRATION RATE: 14 BRPM | HEART RATE: 56 BPM | DIASTOLIC BLOOD PRESSURE: 66 MMHG | SYSTOLIC BLOOD PRESSURE: 99 MMHG

## 2019-04-07 VITALS — HEART RATE: 89 BPM | SYSTOLIC BLOOD PRESSURE: 108 MMHG | RESPIRATION RATE: 18 BRPM | DIASTOLIC BLOOD PRESSURE: 68 MMHG

## 2019-04-07 LAB
ADD MAN DIFF?: NO
BASOPHIL #: 0 10^3/UL (ref 0–0.1)
BASOPHILS %: 0.4 % (ref 0–2)
EOSINOPHILS #: 0.6 10^3/UL (ref 0–0.5)
EOSINOPHILS %: 7.1 % (ref 0–7)
HEMATOCRIT: 28 % (ref 37–47)
HEMOGLOBIN: 9.4 G/DL (ref 12–16)
IMMATURE GRANS #M: 0.02 10^3/UL (ref 0–0.03)
IMMATURE GRANS % (M): 0.3 % (ref 0–0.43)
LYMPHOCYTES #: 2.9 10^3/UL (ref 0.8–2.9)
LYMPHOCYTES %: 37.5 % (ref 15–51)
MEAN CORPUSCULAR HEMOGLOBIN: 26.5 PG (ref 29–33)
MEAN CORPUSCULAR HGB CONC: 33.6 G/DL (ref 32–37)
MEAN CORPUSCULAR VOLUME: 78.9 FL (ref 82–101)
MEAN PLATELET VOLUME: 11.9 FL (ref 7.4–10.4)
MONOCYTE #: 0.5 10^3/UL (ref 0.3–0.9)
MONOCYTES %: 6.2 % (ref 0–11)
NEUTROPHIL #: 3.7 10^3/UL (ref 1.6–7.5)
NEUTROPHILS %: 48.5 % (ref 39–77)
NUCLEATED RED BLOOD CELLS #: 0 10^3/UL (ref 0–0)
NUCLEATED RED BLOOD CELLS%: 0 /100WBC (ref 0–0)
PLATELET COUNT: 207 10^3/UL (ref 140–415)
RED BLOOD COUNT: 3.55 10^6/UL (ref 4.2–5.4)
RED CELL DISTRIBUTION WIDTH: 13.6 % (ref 11.5–14.5)
WHITE BLOOD COUNT: 7.7 10^3/UL (ref 4.8–10.8)

## 2019-04-07 PROCEDURE — 0UB14ZZ EXCISION OF LEFT OVARY, PERCUTANEOUS ENDOSCOPIC APPROACH: ICD-10-PCS

## 2019-04-07 PROCEDURE — 0UB14ZZ EXCISION OF LEFT OVARY, PERCUTANEOUS ENDOSCOPIC APPROACH: ICD-10-PCS | Performed by: OBSTETRICS & GYNECOLOGY

## 2019-04-07 RX ADMIN — FAMOTIDINE SCH MG: 20 TABLET ORAL at 08:01

## 2019-04-07 RX ADMIN — PIPERACILLIN SODIUM AND TAZOBACTAM SODIUM SCH MLS/HR: 3; .375 INJECTION, POWDER, LYOPHILIZED, FOR SOLUTION INTRAVENOUS at 17:33

## 2019-04-07 RX ADMIN — PIPERACILLIN SODIUM AND TAZOBACTAM SODIUM 1 MLS/HR: 3; .375 INJECTION, POWDER, LYOPHILIZED, FOR SOLUTION INTRAVENOUS at 05:48

## 2019-04-07 RX ADMIN — PIPERACILLIN SODIUM AND TAZOBACTAM SODIUM 1 MLS/HR: 3; .375 INJECTION, POWDER, LYOPHILIZED, FOR SOLUTION INTRAVENOUS at 13:36

## 2019-04-07 RX ADMIN — FAMOTIDINE 1 MG: 20 TABLET ORAL at 08:01

## 2019-04-07 RX ADMIN — PIPERACILLIN SODIUM AND TAZOBACTAM SODIUM 1 MLS/HR: 3; .375 INJECTION, POWDER, LYOPHILIZED, FOR SOLUTION INTRAVENOUS at 00:02

## 2019-04-07 RX ADMIN — FAMOTIDINE SCH MG: 20 TABLET ORAL at 20:08

## 2019-04-07 RX ADMIN — PIPERACILLIN SODIUM AND TAZOBACTAM SODIUM SCH MLS/HR: 3; .375 INJECTION, POWDER, LYOPHILIZED, FOR SOLUTION INTRAVENOUS at 00:02

## 2019-04-07 RX ADMIN — PIPERACILLIN SODIUM AND TAZOBACTAM SODIUM SCH MLS/HR: 3; .375 INJECTION, POWDER, LYOPHILIZED, FOR SOLUTION INTRAVENOUS at 05:48

## 2019-04-07 RX ADMIN — PIPERACILLIN SODIUM AND TAZOBACTAM SODIUM 1 MLS/HR: 3; .375 INJECTION, POWDER, LYOPHILIZED, FOR SOLUTION INTRAVENOUS at 11:46

## 2019-04-07 RX ADMIN — IBUPROFEN PRN MG: 600 TABLET ORAL at 23:39

## 2019-04-07 RX ADMIN — IBUPROFEN 1 MG: 600 TABLET ORAL at 23:39

## 2019-04-07 RX ADMIN — PIPERACILLIN SODIUM AND TAZOBACTAM SODIUM SCH MLS/HR: 3; .375 INJECTION, POWDER, LYOPHILIZED, FOR SOLUTION INTRAVENOUS at 11:46

## 2019-04-07 RX ADMIN — PIPERACILLIN SODIUM AND TAZOBACTAM SODIUM 1 MLS/HR: 3; .375 INJECTION, POWDER, LYOPHILIZED, FOR SOLUTION INTRAVENOUS at 23:45

## 2019-04-07 RX ADMIN — PIPERACILLIN SODIUM AND TAZOBACTAM SODIUM SCH MLS/HR: 3; .375 INJECTION, POWDER, LYOPHILIZED, FOR SOLUTION INTRAVENOUS at 13:36

## 2019-04-07 RX ADMIN — PIPERACILLIN SODIUM AND TAZOBACTAM SODIUM SCH MLS/HR: 3; .375 INJECTION, POWDER, LYOPHILIZED, FOR SOLUTION INTRAVENOUS at 23:45

## 2019-04-07 RX ADMIN — FAMOTIDINE 1 MG: 20 TABLET ORAL at 20:08

## 2019-04-07 RX ADMIN — PIPERACILLIN SODIUM AND TAZOBACTAM SODIUM 1 MLS/HR: 3; .375 INJECTION, POWDER, LYOPHILIZED, FOR SOLUTION INTRAVENOUS at 17:33

## 2019-04-07 NOTE — OPPN
Date/Time of Note


Date/Time of Note


DATE: 4/7/19 


TIME: 12:19





Operative Report


Preoperative Diagnosis


Right ovarian torsion and cyst


Postoperative Diagnosis


Left Ruptured ovarian cyst and endometrioma ,Extensive endometriosis and large 


fibroid uterus


Operation/Procedure Performed


Operative laparoscopy left ovarian cystectomy ,copious irrigation of abdominal 


and pelvic cavity


Surgeon


see signature line


First assist


none


Anesthesia:  general


Estimated blood loss:  0 - 10 ml's


Transfusion Required


   none


Specimen


left ovarian cyst


Grafts/Implants


none


Complications


none











DARIUSZ PRUITT M.D.             Apr 7, 2019 12:22

## 2019-04-07 NOTE — HP
DATE OF ADMISSION: 2019

 

HISTORY OF PRESENT ILLNESS:  This 34-year-old  0, para 0 admitted to the emergency room with s
evere abdominal pain, some fluid in the pelvis and also was treated with antibiotics for possible tom
gnosis of TOA.

 

PAST MEDICAL HISTORY:  Denies.

 

PAST SURGICAL HISTORY:  Denies.

 

ALLERGIES:  NKDA.

 

PHYSICAL EXAMINATION:

VITAL SIGNS:  Stable.

GENERAL:  Normal.

ABDOMEN:  Tender in the right lower quadrant and in the mid lower part of the abdomen.  Ultrasound sh
owed a large right ovarian cyst with some fluid in the pelvic cavity.

 

ASSESSMENT AND PLAN:  A 33-year-old  0, para 0 with large abdominal cyst and abdominal pain wa
s consented for operative laparoscopy, possible salpingo-oophorectomy, possible ovarian cystectomy.  
Possible diagnosis of ovarian torsion versus ruptured ovarian cyst.  Risks and benefits discussed.  T
he patient signed the consent and alternatives discussed with the patient.  Risks and benefits of alt
ernatives discussed.  The patient was taken to the operating room.

 

 

Dictated By: DARIUSZ PRUITT MD

 

RG/NTS

DD:    2019 12:53:25

DT:    2019 14:07:59

Conf#: [id number]

DID#:  [id number]

CC: MEESOOK KIM MD;*EndCC*

## 2019-04-07 NOTE — PAC
Date/Time of Note


Date/Time of Note


DATE: 4/7/19 


TIME: 12:22





Post-Anesthesia Notes


Post-Anesthesia Note


Last documented vital signs





Vital Signs


  Date      Temp  Pulse  Resp  B/P (MAP)   Pulse Ox  O2          O2 Flow    FiO2


Time                                                 Delivery    Rate


    4/7/19  98.6


     12:14


    4/7/19           74    17      112/64        97


     07:18                           (80)


    4/6/19                                           Room Air


     14:00





Activity:  WNL


Respiratory function:  WNL


Cardiovascular function:  WNL


Mental status:  Baseline


Pain reasonably controlled:  Yes


Hydration appropriate:  Yes


Nausea/Vomiting absent:  Yes


Comments


BP: 107/62


HR: 60


RR: 15


T: 98.6


SaO2: 97% LEIGH DANIELLE MD                 Apr 7, 2019 12:23

## 2019-04-07 NOTE — OPR
DATE OF OPERATION:  04/07/2019

 

 

PREOPERATIVE DIAGNOSIS:  Large right ovarian cyst and a possible ovarian torsion.

 

POSTOPERATIVE DIAGNOSIS:  Left large ovarian cyst endometrioma ruptured ovarian cyst and some fluid i
n the pelvic cavity.  

 

OPERATION PERFORMED:  Operative laparoscopy, left ovarian cystectomy copious irrigation of abdominal 
and pelvic cavity.

 

ESTIMATED BLOOD LOSS:  Less than 10 mL.

 

COMPLICATIONS:  None.

 

ANESTHESIOLOGIST:  Dr. Zacarias.

 

ANESTHESIA:  General.

 

COMPLICATIONS:  None.

 

ESTIMATED BLOOD LOSS:  Less than 10 mL.

 

TECHNIQUE:  The patient was taken to the operating room where general anesthesia was found to be adeq
uate.  The patient was placed in dorsal supine position.  After prep and drape, a weighted speculum w
as placed inside the vaginal vault.  Anterior lip of the cervix was grasped by single-tooth tenaculum
.  Cervix was dilated by Adam dilators.  HUMI was inserted.  Then, attention was turned to abdominal
 field.  A 1 cm incision was made 2 cm above the umbilicus.  First trocar was inserted under direct v
isualization of the camera.  Intra-abdominal cavity was filled up using 4 liters of CO2.  Second and 
third trocar was inserted both sides 10 cm from the first trocar under direct visualization of the ca
justin.  Pelvic fluid was noted.  This was collected for cytology.  The large bilobed left ovarian cyst
 was noticed that it was leaking chocolate material.  A left ovarian cystectomy was done, both lobes 
were removed.  Hemostasis achieved.  Copious irrigation of abdominal and pelvic cavity was done.  Pic
tures taken.  Gas was removed.  Trocar was removed under direct visualization of the camera.  A skin 
incision was closed using 3-0 Monocryl sutures.  Dermabond was placed on top of the incision.  HUMI w
as removed.  The patient tolerated the procedure well and was transferred to recovery room in stable 
condition.  There was no complication regarding this procedure.

 

 

Dictated By: DARIUSZ OROSCO/ISIDRO

DD:    04/07/2019 12:50:43

DT:    04/07/2019 14:00:46

Conf#: [id number]

DID#:  [id number]

## 2019-04-07 NOTE — PREAC
Date/Time of Note


Date/Time of Note


DATE: 4/7/19 


TIME: 09:55





Anesthesia Eval and Record


Evaluation


Time Pre-Procedure Interview


DATE: 4/7/19 


TIME: 09:55


Age


33


Sex


female


NPO:  8 hrs


Preoperative diagnosis


ovarian cyst


Planned procedure


operative laparoscopy, possible right ovarian cystectomy, possible salpingo-


oophorectomy, possible laparotomy





Past Medical History


Past Medical History:  Includes (fibroids )


Heme:  Anemia





Surgery & Anesthesia Issues


No known issue





Meds


Anticoagulation:  No


Beta Blocker within 24 hr:  No


Reason Beta Blocker not given:  Pt. not on B-Blocker





Current Medications


Ibuprofen (Motrin) 600 mg Q6H  PRN PO PAIN LEVEL 6-10 Last administered on 


4/5/19at 21:33; Admin Dose 600 MG;  Start 4/5/19 at 07:00


Piperacillin Sod/ Tazobactam Sod 100 ml @  200 mls/hr Q6 IVPB  Last administered


on 4/7/19at 05:48; Admin Dose 200 MLS/HR;  Start 4/5/19 at 14:00


Famotidine (Pepcid) 20 mg BID PO  Last administered on 4/6/19at 21:01; Admin 


Dose 20 MG;  Start 4/5/19 at 21:00


Meds reviewed:  Yes





Allergies


Coded Allergies:  


     No Known Allergy (Unverified , 4/4/19)


Allergies Reviewed:  Yes





Labs/Studies


Labs Reviewed:  Reviewed by anesthesiologist


Result Diagram:  


4/7/19 0432                                                                     


          4/5/19 0551





Laboratory Tests


4/7/19 04:32








Pregnancy test:  Negative





Pre-procedure Exam


Last vitals





Vital Signs


  Date      Temp  Pulse  Resp  B/P (MAP)   Pulse Ox  O2          O2 Flow    FiO2


Time                                                 Delivery    Rate


    4/7/19  98.2     74    17      112/64        97


     07:18                           (80)


    4/6/19                                           Room Air


     14:00





Airway:  Adequate mouth opening, Adequate thyromental dist


Mallampati:  Mallampati II


Teeth:  Normal


Lung:  Normal


Heart:  Normal





ASA Physical Status


ASA physical status:  2


Emergency:  None





Planned Anesthetic


General/MAC:  ETT





Planned Pain Management


Parenteral pain med





Pre-operative Attestations


Prior to commencing anesthesia and surgery, the patient was re-evaluated, there 


was verification of:


*The patient's identity


*The results of appropriate recent lab work and preoperative vital signs


*The above evaluation not changing prior to induction


*Anesthetic plan, risk benefits, alternative and complications discussed with 


patient/family; questions answered; patient/family understands, accepts and 


wishes to proceed.











LEIGH WRIGHT MD                 Apr 7, 2019 09:58

## 2019-04-08 VITALS — RESPIRATION RATE: 19 BRPM | SYSTOLIC BLOOD PRESSURE: 119 MMHG | DIASTOLIC BLOOD PRESSURE: 62 MMHG | HEART RATE: 82 BPM

## 2019-04-08 VITALS — DIASTOLIC BLOOD PRESSURE: 65 MMHG | HEART RATE: 77 BPM | SYSTOLIC BLOOD PRESSURE: 110 MMHG | RESPIRATION RATE: 18 BRPM

## 2019-04-08 RX ADMIN — FAMOTIDINE SCH MG: 20 TABLET ORAL at 08:34

## 2019-04-08 RX ADMIN — PIPERACILLIN SODIUM AND TAZOBACTAM SODIUM SCH MLS/HR: 3; .375 INJECTION, POWDER, LYOPHILIZED, FOR SOLUTION INTRAVENOUS at 06:10

## 2019-04-08 RX ADMIN — IBUPROFEN 1 MG: 600 TABLET ORAL at 09:27

## 2019-04-08 RX ADMIN — IBUPROFEN PRN MG: 600 TABLET ORAL at 09:27

## 2019-04-08 RX ADMIN — FAMOTIDINE 1 MG: 20 TABLET ORAL at 08:34

## 2019-04-08 RX ADMIN — PIPERACILLIN SODIUM AND TAZOBACTAM SODIUM SCH MLS/HR: 3; .375 INJECTION, POWDER, LYOPHILIZED, FOR SOLUTION INTRAVENOUS at 11:22

## 2019-04-08 RX ADMIN — PIPERACILLIN SODIUM AND TAZOBACTAM SODIUM 1 MLS/HR: 3; .375 INJECTION, POWDER, LYOPHILIZED, FOR SOLUTION INTRAVENOUS at 06:10

## 2019-04-08 RX ADMIN — PIPERACILLIN SODIUM AND TAZOBACTAM SODIUM 1 MLS/HR: 3; .375 INJECTION, POWDER, LYOPHILIZED, FOR SOLUTION INTRAVENOUS at 11:22

## 2019-04-08 NOTE — QN
Documentation


Comment


patient is comfortable resting in bed ,Minimum pain


VS stable


Gen NAD


Abd soft NT ND incisions are intact


Genitalia no blood at perineum


--->Discharged home with precautions 


--->Questions answered











DARIUSZ PRUITT M.D.             Apr 8, 2019 11:28

## 2019-04-08 NOTE — DS
Date/Time of Note


Date/Time of Note


DATE: 4/8/19 


TIME: 11:25





Discharge Summary


Admission/Discharge Info


Admit Date/Time


Apr 5, 2019 at 04:11


Discharge Date/Time


4/8/2019


Discharge Diagnosis


Ruptured let ovarian cyst and Endometrioma


Patient Condition:  Good


Procedures


Operative laparoscopy left ovarian cystectomy


Hospital Course


uneventful


Home Meds


No Active Prescriptions or Reported Meds


Primary Care Provider


Not On Staff Doctor











DARIUSZ PRUITT M.D.             Apr 8, 2019 11:26

## 2019-06-29 ENCOUNTER — HOSPITAL ENCOUNTER (EMERGENCY)
Dept: HOSPITAL 54 - ER | Age: 34
Discharge: HOME | End: 2019-06-29
Payer: COMMERCIAL

## 2019-06-29 VITALS — HEIGHT: 68 IN | WEIGHT: 293 LBS | BODY MASS INDEX: 44.41 KG/M2

## 2019-06-29 VITALS — SYSTOLIC BLOOD PRESSURE: 107 MMHG | DIASTOLIC BLOOD PRESSURE: 68 MMHG

## 2019-06-29 DIAGNOSIS — D25.9: ICD-10-CM

## 2019-06-29 DIAGNOSIS — N83.201: ICD-10-CM

## 2019-06-29 DIAGNOSIS — N83.202: Primary | ICD-10-CM

## 2019-06-29 LAB
BASOPHILS # BLD AUTO: 0.1 /CMM (ref 0–0.2)
BASOPHILS NFR BLD AUTO: 0.7 % (ref 0–2)
BUN SERPL-MCNC: 12 MG/DL (ref 7–18)
CALCIUM SERPL-MCNC: 8.5 MG/DL (ref 8.5–10.1)
CHLORIDE SERPL-SCNC: 105 MMOL/L (ref 98–107)
CO2 SERPL-SCNC: 24 MMOL/L (ref 21–32)
CREAT SERPL-MCNC: 0.8 MG/DL (ref 0.6–1.3)
EOSINOPHIL NFR BLD AUTO: 2 % (ref 0–6)
GLUCOSE SERPL-MCNC: 89 MG/DL (ref 74–106)
HCT VFR BLD AUTO: 35 % (ref 33–45)
HGB BLD-MCNC: 12.1 G/DL (ref 11.5–14.8)
LYMPHOCYTES NFR BLD AUTO: 1.4 /CMM (ref 0.8–4.8)
LYMPHOCYTES NFR BLD AUTO: 13.7 % (ref 20–44)
MCHC RBC AUTO-ENTMCNC: 35 G/DL (ref 31–36)
MCV RBC AUTO: 81 FL (ref 82–100)
MONOCYTES NFR BLD AUTO: 0.6 /CMM (ref 0.1–1.3)
MONOCYTES NFR BLD AUTO: 6.1 % (ref 2–12)
NEUTROPHILS # BLD AUTO: 7.8 /CMM (ref 1.8–8.9)
NEUTROPHILS NFR BLD AUTO: 77.5 % (ref 43–81)
PLATELET # BLD AUTO: 218 /CMM (ref 150–450)
POTASSIUM SERPL-SCNC: 3.4 MMOL/L (ref 3.5–5.1)
RBC # BLD AUTO: 4.3 MIL/UL (ref 4–5.2)
SODIUM SERPL-SCNC: 138 MMOL/L (ref 136–145)
WBC NRBC COR # BLD AUTO: 10.1 K/UL (ref 4.3–11)

## 2019-06-29 PROCEDURE — 84702 CHORIONIC GONADOTROPIN TEST: CPT

## 2019-06-29 PROCEDURE — 80048 BASIC METABOLIC PNL TOTAL CA: CPT

## 2019-06-29 PROCEDURE — 99284 EMERGENCY DEPT VISIT MOD MDM: CPT

## 2019-06-29 PROCEDURE — 96374 THER/PROPH/DIAG INJ IV PUSH: CPT

## 2019-06-29 PROCEDURE — 85730 THROMBOPLASTIN TIME PARTIAL: CPT

## 2019-06-29 PROCEDURE — 74177 CT ABD & PELVIS W/CONTRAST: CPT

## 2019-06-29 PROCEDURE — 76856 US EXAM PELVIC COMPLETE: CPT

## 2019-06-29 PROCEDURE — 85025 COMPLETE CBC W/AUTO DIFF WBC: CPT

## 2019-06-29 PROCEDURE — 96375 TX/PRO/DX INJ NEW DRUG ADDON: CPT

## 2019-06-29 PROCEDURE — 84703 CHORIONIC GONADOTROPIN ASSAY: CPT

## 2019-06-29 PROCEDURE — 36415 COLL VENOUS BLD VENIPUNCTURE: CPT

## 2019-06-29 NOTE — NUR
[id number] C/O SUDDEN ONSET PELVIC AREA PAIN AT 0630, ALSO C/O NAUSEA. TO ER BED 
16, HOOKED TO MONITOR, CHANGED TO GOWN, PROVIDED W WARM BLANKET, AWAITING MD BEAVERS

## 2021-06-08 ENCOUNTER — HOSPITAL ENCOUNTER (EMERGENCY)
Dept: HOSPITAL 54 - ER | Age: 36
Discharge: HOME | End: 2021-06-08
Payer: COMMERCIAL

## 2021-06-08 VITALS — SYSTOLIC BLOOD PRESSURE: 109 MMHG | DIASTOLIC BLOOD PRESSURE: 32 MMHG

## 2021-06-08 VITALS — HEIGHT: 68 IN | WEIGHT: 165 LBS | BODY MASS INDEX: 25.01 KG/M2

## 2021-06-08 DIAGNOSIS — Z79.899: ICD-10-CM

## 2021-06-08 DIAGNOSIS — N83.202: Primary | ICD-10-CM

## 2021-06-08 DIAGNOSIS — N83.201: ICD-10-CM

## 2021-06-08 LAB
ALBUMIN SERPL BCP-MCNC: 3.8 G/DL (ref 3.4–5)
ALP SERPL-CCNC: 60 U/L (ref 46–116)
ALT SERPL W P-5'-P-CCNC: 17 U/L (ref 12–78)
AST SERPL W P-5'-P-CCNC: 11 U/L (ref 15–37)
BASOPHILS # BLD AUTO: 0.1 /CMM (ref 0–0.2)
BASOPHILS NFR BLD AUTO: 0.6 % (ref 0–2)
BILIRUB DIRECT SERPL-MCNC: 0.1 MG/DL (ref 0–0.2)
BILIRUB SERPL-MCNC: 0.5 MG/DL (ref 0.2–1)
BILIRUB UR QL STRIP: (no result)
BUN SERPL-MCNC: 8 MG/DL (ref 7–18)
CALCIUM SERPL-MCNC: 9.5 MG/DL (ref 8.5–10.1)
CHLORIDE SERPL-SCNC: 104 MMOL/L (ref 98–107)
CO2 SERPL-SCNC: 22 MMOL/L (ref 21–32)
COLOR UR: YELLOW
CREAT SERPL-MCNC: 0.9 MG/DL (ref 0.6–1.3)
EOSINOPHIL NFR BLD AUTO: 0.3 % (ref 0–6)
GLUCOSE SERPL-MCNC: 110 MG/DL (ref 74–106)
GLUCOSE UR STRIP-MCNC: NEGATIVE MG/DL
HCT VFR BLD AUTO: 36 % (ref 33–45)
HGB BLD-MCNC: 12.1 G/DL (ref 11.5–14.8)
LEUKOCYTE ESTERASE UR QL STRIP: NEGATIVE
LYMPHOCYTES NFR BLD AUTO: 1.8 /CMM (ref 0.8–4.8)
LYMPHOCYTES NFR BLD AUTO: 9.8 % (ref 20–44)
MCHC RBC AUTO-ENTMCNC: 33 G/DL (ref 31–36)
MCV RBC AUTO: 81 FL (ref 82–100)
MONOCYTES NFR BLD AUTO: 1.4 /CMM (ref 0.1–1.3)
MONOCYTES NFR BLD AUTO: 7.6 % (ref 2–12)
NEUTROPHILS # BLD AUTO: 14.7 /CMM (ref 1.8–8.9)
NEUTROPHILS NFR BLD AUTO: 81.7 % (ref 43–81)
NITRITE UR QL STRIP: NEGATIVE
PH UR STRIP: 6 [PH] (ref 5–8)
PLATELET # BLD AUTO: 321 /CMM (ref 150–450)
POTASSIUM SERPL-SCNC: 3.8 MMOL/L (ref 3.5–5.1)
PROT SERPL-MCNC: 8.6 G/DL (ref 6.4–8.2)
PROT UR QL STRIP: (no result) MG/DL
RBC # BLD AUTO: 4.47 MIL/UL (ref 4–5.2)
RBC #/AREA URNS HPF: (no result) /HPF (ref 0–2)
SODIUM SERPL-SCNC: 140 MMOL/L (ref 136–145)
UROBILINOGEN UR STRIP-MCNC: 0.2 EU/DL
WBC #/AREA URNS HPF: (no result) /HPF
WBC #/AREA URNS HPF: (no result) /HPF (ref 0–3)
WBC NRBC COR # BLD AUTO: 18 K/UL (ref 4.3–11)

## 2021-06-08 PROCEDURE — 96365 THER/PROPH/DIAG IV INF INIT: CPT

## 2021-06-08 PROCEDURE — 84702 CHORIONIC GONADOTROPIN TEST: CPT

## 2021-06-08 PROCEDURE — 96361 HYDRATE IV INFUSION ADD-ON: CPT

## 2021-06-08 PROCEDURE — 76856 US EXAM PELVIC COMPLETE: CPT

## 2021-06-08 PROCEDURE — 99284 EMERGENCY DEPT VISIT MOD MDM: CPT

## 2021-06-08 PROCEDURE — 96375 TX/PRO/DX INJ NEW DRUG ADDON: CPT

## 2021-06-08 PROCEDURE — 84703 CHORIONIC GONADOTROPIN ASSAY: CPT

## 2021-06-08 PROCEDURE — 80076 HEPATIC FUNCTION PANEL: CPT

## 2021-06-08 PROCEDURE — 85025 COMPLETE CBC W/AUTO DIFF WBC: CPT

## 2021-06-08 PROCEDURE — 36415 COLL VENOUS BLD VENIPUNCTURE: CPT

## 2021-06-08 PROCEDURE — 80048 BASIC METABOLIC PNL TOTAL CA: CPT

## 2021-06-08 PROCEDURE — 87086 URINE CULTURE/COLONY COUNT: CPT

## 2021-06-08 PROCEDURE — 81001 URINALYSIS AUTO W/SCOPE: CPT
